# Patient Record
Sex: MALE | Race: WHITE | Employment: FULL TIME | URBAN - METROPOLITAN AREA
[De-identification: names, ages, dates, MRNs, and addresses within clinical notes are randomized per-mention and may not be internally consistent; named-entity substitution may affect disease eponyms.]

---

## 2018-11-15 ENCOUNTER — HOSPITAL ENCOUNTER (INPATIENT)
Age: 23
LOS: 3 days | Discharge: HOME | DRG: 885 | End: 2018-11-19
Attending: EMERGENCY MEDICINE | Admitting: PSYCHIATRY & NEUROLOGY
Payer: SELF-PAY

## 2018-11-15 LAB
ALBUMIN SERPL-MCNC: 3.9 G/DL (ref 3.4–5)
ALBUMIN/GLOB SERPL: 1 {RATIO} (ref 0.8–1.7)
ALP SERPL-CCNC: 53 U/L (ref 45–117)
ALT SERPL-CCNC: 24 U/L (ref 16–61)
AMPHET UR QL SCN: NEGATIVE
ANION GAP SERPL CALC-SCNC: 8 MMOL/L (ref 3–18)
AST SERPL-CCNC: 16 U/L (ref 15–37)
BARBITURATES UR QL SCN: NEGATIVE
BASOPHILS # BLD: 0 K/UL (ref 0–0.1)
BASOPHILS NFR BLD: 0 % (ref 0–2)
BENZODIAZ UR QL: NEGATIVE
BILIRUB SERPL-MCNC: 0.2 MG/DL (ref 0.2–1)
BUN SERPL-MCNC: 24 MG/DL (ref 7–18)
BUN/CREAT SERPL: 22 (ref 12–20)
CALCIUM SERPL-MCNC: 9.3 MG/DL (ref 8.5–10.1)
CANNABINOIDS UR QL SCN: NEGATIVE
CHLORIDE SERPL-SCNC: 108 MMOL/L (ref 100–108)
CO2 SERPL-SCNC: 26 MMOL/L (ref 21–32)
COCAINE UR QL SCN: NEGATIVE
CREAT SERPL-MCNC: 1.07 MG/DL (ref 0.6–1.3)
DIFFERENTIAL METHOD BLD: ABNORMAL
EOSINOPHIL # BLD: 0.1 K/UL (ref 0–0.4)
EOSINOPHIL NFR BLD: 1 % (ref 0–5)
ERYTHROCYTE [DISTWIDTH] IN BLOOD BY AUTOMATED COUNT: 12.9 % (ref 11.6–14.5)
ETHANOL SERPL-MCNC: <3 MG/DL (ref 0–3)
GLOBULIN SER CALC-MCNC: 3.8 G/DL (ref 2–4)
GLUCOSE SERPL-MCNC: 126 MG/DL (ref 74–99)
HCT VFR BLD AUTO: 40 % (ref 36–48)
HDSCOM,HDSCOM: NORMAL
HGB BLD-MCNC: 14.2 G/DL (ref 13–16)
LYMPHOCYTES # BLD: 2 K/UL (ref 0.9–3.6)
LYMPHOCYTES NFR BLD: 22 % (ref 21–52)
MCH RBC QN AUTO: 31.2 PG (ref 24–34)
MCHC RBC AUTO-ENTMCNC: 35.5 G/DL (ref 31–37)
MCV RBC AUTO: 87.9 FL (ref 74–97)
METHADONE UR QL: NEGATIVE
MONOCYTES # BLD: 0.7 K/UL (ref 0.05–1.2)
MONOCYTES NFR BLD: 8 % (ref 3–10)
NEUTS SEG # BLD: 6.3 K/UL (ref 1.8–8)
NEUTS SEG NFR BLD: 69 % (ref 40–73)
OPIATES UR QL: NEGATIVE
PCP UR QL: NEGATIVE
PLATELET # BLD AUTO: 296 K/UL (ref 135–420)
PMV BLD AUTO: 9.5 FL (ref 9.2–11.8)
POTASSIUM SERPL-SCNC: 4.2 MMOL/L (ref 3.5–5.5)
PROT SERPL-MCNC: 7.7 G/DL (ref 6.4–8.2)
RBC # BLD AUTO: 4.55 M/UL (ref 4.7–5.5)
SODIUM SERPL-SCNC: 142 MMOL/L (ref 136–145)
WBC # BLD AUTO: 9.1 K/UL (ref 4.6–13.2)

## 2018-11-15 PROCEDURE — 99285 EMERGENCY DEPT VISIT HI MDM: CPT

## 2018-11-15 PROCEDURE — 80307 DRUG TEST PRSMV CHEM ANLYZR: CPT

## 2018-11-15 PROCEDURE — 85025 COMPLETE CBC W/AUTO DIFF WBC: CPT

## 2018-11-15 PROCEDURE — 80053 COMPREHEN METABOLIC PANEL: CPT

## 2018-11-15 RX ORDER — SPIRONOLACTONE 100 MG/1
100 TABLET, FILM COATED ORAL 2 TIMES DAILY
COMMUNITY
End: 2019-07-20

## 2018-11-15 RX ORDER — ESTRADIOL 2 MG/1
TABLET ORAL
COMMUNITY
End: 2019-07-20

## 2018-11-16 PROBLEM — F32.A DEPRESSION: Status: ACTIVE | Noted: 2018-11-16

## 2018-11-16 PROCEDURE — 65220000003 HC RM SEMIPRIVATE PSYCH

## 2018-11-16 RX ORDER — TRAZODONE HYDROCHLORIDE 50 MG/1
50 TABLET ORAL
Status: DISCONTINUED | OUTPATIENT
Start: 2018-11-16 | End: 2018-11-19 | Stop reason: HOSPADM

## 2018-11-16 RX ORDER — IBUPROFEN 600 MG/1
600 TABLET ORAL
Status: DISCONTINUED | OUTPATIENT
Start: 2018-11-16 | End: 2018-11-19 | Stop reason: HOSPADM

## 2018-11-16 RX ORDER — LORAZEPAM 1 MG/1
2 TABLET ORAL
Status: DISCONTINUED | OUTPATIENT
Start: 2018-11-16 | End: 2018-11-19 | Stop reason: HOSPADM

## 2018-11-16 RX ORDER — LORAZEPAM 2 MG/ML
2 INJECTION INTRAMUSCULAR
Status: DISCONTINUED | OUTPATIENT
Start: 2018-11-16 | End: 2018-11-19 | Stop reason: HOSPADM

## 2018-11-16 RX ORDER — LORAZEPAM 2 MG/ML
1 INJECTION INTRAMUSCULAR
Status: DISCONTINUED | OUTPATIENT
Start: 2018-11-16 | End: 2018-11-19 | Stop reason: HOSPADM

## 2018-11-16 RX ORDER — LORAZEPAM 1 MG/1
1 TABLET ORAL
Status: DISCONTINUED | OUTPATIENT
Start: 2018-11-16 | End: 2018-11-19 | Stop reason: HOSPADM

## 2018-11-16 NOTE — BSMART NOTE
Comprehensive Assessment Form Part 1 Section I - Disposition Discussed with Dr. Miguel Hamlin, the plan is to admit; however, there are no available acute psychiatric beds available at [facility]. Calls placed to [facility] and [facility], there are no beds at these facilities, either; will continue to seek bed for patient. Section II - Integrated Summary Patient is a 26year old male, \"transgender,\" who presented to the emergency room with \"suicidal thoughts and voices telling me to kill myself. \"  Patient also stated that he is \"still suicidal with random homicidal thoughts, sad, depressed, and don't know the reasons for feeling this way. \" Patient verbalized that he does not have a plan for suicide or homicide at this time. Patient is willing for voluntary admission for treatment; will seek a bed for patient. The patient is deemed competent to provide informed consent. The information is given by the patient. The Chief Complaint is \"suicidal/homicidal thoughts, voices. \" Previous Hospitalizations:\" None\" Current Psychiatrist and/or  is \"None. \" Section IV - Mental Status Exam 
The patient's appearance bluish/green colored hair, dressed in scrubs, fair hygiene. The patient's behavior shows no evidence of impairment. The patient is oriented to time, place, person and situation. The patient's speech shows no evidence of impairment. The patient's mood  is depressed. The range of affect is flat. The patient's thought content  demonstrates no evidence of impairment. The thought process shows no evidence of impairment. The patient's memory shows no evidence of impairment. The patient's appetite shows no evidence of impairment. The patient's sleep \"3-5 hrs/night, but up most of the night. \". Insight is impaired. The patient's judgement is psychologically impaired. Section VI - Living Arrangements The patient is single. The patient lives with a parent. The patient has no children. The patient does plan to return home upon discharge. The patient does not have legal issues pending. The patient's source of income comes from employment. Yazidism and cultural practices have not been voiced at this time. The patient's greatest support comes from [facility], brother, [phone]. \" 
 
 Keturah Palma RN

## 2018-11-16 NOTE — ED NOTES
TRANSFER - OUT REPORT: 
 
Verbal report given to JIM Angulo(name) on Rosalee Aranda  being transferred to Adult behavioral health(unit) for routine progression of care Report consisted of patients Situation, Background, Assessment and  
Recommendations(SBAR). Information from the following report(s) ED Summary was reviewed with the receiving nurse. Lines:    
 
Opportunity for questions and clarification was provided.    
 
Patient transported with:

## 2018-11-16 NOTE — BSMART NOTE
Patient continues to verbalize suicidal thoughts with plan to cut his wrist. Has multiple cuts to both arms. Easily engaged, states he has been depressed for a long time with no specific triggers. No history of previous treatment, no prescribed medications for depression. Stated the only counseling  he has ever received was a two day free online service. Stated he is ok with rooming with a male patient even though he is in the process of changing his gender, has male anatomy. Will continue to search placement for him.

## 2018-11-16 NOTE — ED PROVIDER NOTES
EMERGENCY DEPARTMENT HISTORY AND PHYSICAL EXAM 
 
1:47 AM 
 
 
Date: 11/15/2018 Patient Name: Rosanna Evans History of Presenting Illness Chief Complaint Patient presents with  Mental Health Problem History Provided By: Patient Chief Complaint: Suicidal ideations Duration: Years Timing:  Constant Location: NA 
Quality: NA Severity: Moderate Modifying Factors: None Associated Symptoms: depression and self-injury Additional History (Context): Rosanna Evans is a 26 y.o. adult with no PMHx who presents with c/o moderate constant suicidal ideations that started Years ago with associated Sx of depression and self-injury. Pt states he has had thoughts of suicide for as long as he can remember. Pt states he is not currently on medications for depression and has never been. He sates that nothing in particular happened tonight that made the suicidal thoughts worse. Denies any further complaints or symptoms at the moment. PCP: None Current Outpatient Medications Medication Sig Dispense Refill  spironolactone (ALDACTONE) 100 mg tablet Take 200 mg by mouth daily.  estradiol (ESTRACE) 0.5 mg tablet Take  by mouth daily.  PROGESTERONE by Does Not Apply route. Past History Past Medical History: No past medical history on file. Past Surgical History: No past surgical history on file. Family History: No family history on file. Social History: 
Social History Tobacco Use  Smoking status: Not on file Substance Use Topics  Alcohol use: Not on file  Drug use: Not on file Allergies: 
No Known Allergies Review of Systems Review of Systems Psychiatric/Behavioral: Positive for self-injury and suicidal ideas. Positive for depression All other systems reviewed and are negative. Physical Exam  
 
Visit Vitals /79 (BP 1 Location: Left arm) Pulse (!) 108 Temp 98.8 °F (37.1 °C) Resp 17  
 Wt 85.3 kg (188 lb) SpO2 99% Physical Exam  
Psychiatric: She expresses suicidal ideation. Patient has several self inflicted superficial lacerations of bilateral arms Physical Exam: 
. Patient Vitals for the past 12 hrs: 
 Temp Pulse Resp BP SpO2  
11/15/18 2156 98.8 °F (37.1 °C) (!) 108 17 136/79 99 % Gen: Well developed, well nourished 26 y.o. adult HEENT: Normocephalic, atraumatic Respiratory: No accessory muscle use No wheeze, No rales, No rhonchi. Normal chest wall excursion. No subcutaneous air, no rib crepitus Cardiovascular: Regular rhythm and rate, Normal pulses, Normal perfusion. No edema Gastrointestinal: Non distended, Non tender, No masses. No ascites. No organomegaly. No evidence of trauma Musculoskeletal: Full range of motion at all other tested joints. No joint effusions. Neuro: Normal strength, Normal sensation. Normal speech. No ataxia. Cranial Nerves II-XII normal as tested. Skin: No rash, petechia or purpura. Warm and dry Psyche: No suicidal ideation, No homicidal ideation. No hallucinations. Organized thoughts. Heme: Normal 
: Deferred Diagnostic Study Results Labs - Recent Results (from the past 12 hour(s)) DRUG SCREEN, URINE Collection Time: 11/15/18 10:01 PM  
Result Value Ref Range BENZODIAZEPINES NEGATIVE  NEG    
 BARBITURATES NEGATIVE  NEG    
 THC (TH-CANNABINOL) NEGATIVE  NEG    
 OPIATES NEGATIVE  NEG    
 PCP(PHENCYCLIDINE) NEGATIVE  NEG    
 COCAINE NEGATIVE  NEG    
 AMPHETAMINES NEGATIVE  NEG METHADONE NEGATIVE  NEG HDSCOM (NOTE) CBC WITH AUTOMATED DIFF Collection Time: 11/15/18 10:54 PM  
Result Value Ref Range WBC 9.1 4.6 - 13.2 K/uL  
 RBC 4.55 (L) 4.70 - 5.50 M/uL  
 HGB 14.2 13.0 - 16.0 g/dL HCT 40.0 36.0 - 48.0 % MCV 87.9 74.0 - 97.0 FL  
 MCH 31.2 24.0 - 34.0 PG  
 MCHC 35.5 31.0 - 37.0 g/dL  
 RDW 12.9 11.6 - 14.5 % PLATELET 695 594 - 544 K/uL  MPV 9.5 9.2 - 11.8 FL  
 NEUTROPHILS 69 40 - 73 % LYMPHOCYTES 22 21 - 52 % MONOCYTES 8 3 - 10 % EOSINOPHILS 1 0 - 5 % BASOPHILS 0 0 - 2 %  
 ABS. NEUTROPHILS 6.3 1.8 - 8.0 K/UL  
 ABS. LYMPHOCYTES 2.0 0.9 - 3.6 K/UL  
 ABS. MONOCYTES 0.7 0.05 - 1.2 K/UL  
 ABS. EOSINOPHILS 0.1 0.0 - 0.4 K/UL  
 ABS. BASOPHILS 0.0 0.0 - 0.1 K/UL  
 DF AUTOMATED METABOLIC PANEL, COMPREHENSIVE Collection Time: 11/15/18 10:54 PM  
Result Value Ref Range Sodium 142 136 - 145 mmol/L Potassium 4.2 3.5 - 5.5 mmol/L Chloride 108 100 - 108 mmol/L  
 CO2 26 21 - 32 mmol/L Anion gap 8 3.0 - 18 mmol/L Glucose 126 (H) 74 - 99 mg/dL BUN 24 (H) 7.0 - 18 MG/DL Creatinine 1.07 0.6 - 1.3 MG/DL  
 BUN/Creatinine ratio 22 (H) 12 - 20 GFR est AA >60 >60 ml/min/1.73m2 GFR est non-AA >60 >60 ml/min/1.73m2 Calcium 9.3 8.5 - 10.1 MG/DL Bilirubin, total 0.2 0.2 - 1.0 MG/DL  
 ALT (SGPT) 24 16 - 61 U/L  
 AST (SGOT) 16 15 - 37 U/L Alk. phosphatase 53 45 - 117 U/L Protein, total 7.7 6.4 - 8.2 g/dL Albumin 3.9 3.4 - 5.0 g/dL Globulin 3.8 2.0 - 4.0 g/dL A-G Ratio 1.0 0.8 - 1.7 ETHYL ALCOHOL Collection Time: 11/15/18 10:54 PM  
Result Value Ref Range ALCOHOL(ETHYL),SERUM <3 0 - 3 MG/DL Radiologic Studies - No orders to display Medical Decision Making I am the first provider for this patient. I reviewed the vital signs, available nursing notes, past medical history, past surgical history, family history and social history. Vital Signs-Reviewed the patient's vital signs. Pulse Oximetry Analysis -  99% on room air (Interpretation) normal 
 
Records Reviewed: Nursing Notes (Time of Review: 1:47 AM) Diagnosis Clinical Impression: Mood disorder 6:41 AM : Pt care transferred to Dr. Lauren Sierra  ,ED provider. History of patient complaint(s), available diagnostic reports and current treatment plan has been discussed thoroughly. Bedside rounding on patient occured : yes . Pending diagnostics reports and/or labs (please list): Crisis evaluation Disposition: Pending Follow-up Information None Medication List  
  
ASK your doctor about these medications   
estradiol 0.5 mg tablet Commonly known as:  ESTRACE PROGESTERONE 
  
spironolactone 100 mg tablet Commonly known as:  ALDACTONE 
  
  
 
_______________________________ Scribe Attestation Natacha Chavez acting as a scribe for and in the presence of Imani Carter MD     
November 16, 2018 at 1:47 AM 
    
Provider Attestation:     
I personally performed the services described in the documentation, reviewed the documentation, as recorded by the scribe in my presence, and it accurately and completely records my words and actions. November 16, 2018 at 1:47 AM - Imani Carter MD   
 
 
_______________________________

## 2018-11-16 NOTE — ED TRIAGE NOTES
States he is having voiceds in his head. States voices tell  Him \"bad Things\"   States he is having thoughts of hurting self, denies plan

## 2018-11-16 NOTE — ED NOTES
proceded to draw blood on patient. Pt raised sleeve and I noted 4 fresh straight line cut marks with dried blood on right forarm. Pt states he cut it with a razor this afternoon

## 2018-11-17 PROCEDURE — 65220000003 HC RM SEMIPRIVATE PSYCH

## 2018-11-17 PROCEDURE — 74011250637 HC RX REV CODE- 250/637: Performed by: PSYCHIATRY & NEUROLOGY

## 2018-11-17 RX ORDER — SPIRONOLACTONE 25 MG/1
100 TABLET ORAL 2 TIMES DAILY
Status: DISCONTINUED | OUTPATIENT
Start: 2018-11-17 | End: 2018-11-19 | Stop reason: HOSPADM

## 2018-11-17 RX ORDER — PROGESTERONE 100 MG/1
100 CAPSULE ORAL
Status: DISCONTINUED | OUTPATIENT
Start: 2018-11-17 | End: 2018-11-19 | Stop reason: HOSPADM

## 2018-11-17 RX ORDER — ESTRADIOL 1 MG/1
2 TABLET ORAL 2 TIMES DAILY
Status: DISCONTINUED | OUTPATIENT
Start: 2018-11-17 | End: 2018-11-17

## 2018-11-17 RX ORDER — ESTRADIOL 1 MG/1
3 TABLET ORAL 2 TIMES DAILY
Status: DISCONTINUED | OUTPATIENT
Start: 2018-11-17 | End: 2018-11-19 | Stop reason: HOSPADM

## 2018-11-17 RX ADMIN — PROGESTERONE 100 MG: 100 CAPSULE ORAL at 21:40

## 2018-11-17 RX ADMIN — ESTRADIOL 3 MG: 1 TABLET ORAL at 21:40

## 2018-11-17 RX ADMIN — SPIRONOLACTONE 100 MG: 25 TABLET ORAL at 21:40

## 2018-11-17 NOTE — H&P
HPI: Simba Arreola is a transgendered 26 y.o. Male on hormone therapy (He identifies as female and prefers to be called Vicki, although he indicated he would be fine in a male room) who presented to the ED with SI and reporting AH telling him to kill himself. He also reported HI, but with no plan. On my interview on the unit he denied SI, HI, AH, or VH, and said he felt \"a lot better. \" He could not state any triggers for his SI. 
 
PPH: He reports no previous psychiatric or psychotherapeutic treatment. He reports feeling depressed and suicidal \"for as long as I can remember, but I've never talked to anyone about it. \" He has cut himself with a razor on his arms and legs, but reports this is for self-harm, not suicide. PMH: Began hormone treatment 3/18: 
Spironolactone 100 mg. 2qd Estradiol 0.5 mg. (per chart, he reports 6 mg. Every day, 4 mg. qAM and 2 mg. Qhs). Progesterone (\"does not apply\" per chart). SA: Occasional alcohol use, denies drug use. Tox screen and BAL on admission were negative. Lab Data: Unremarkable on admission. MSE: Yusef Salgado is a 26 y.o. transgendered individual with hair dyed blue and green. He makes good eye contact, and speech is normal in rate and tone. Thought is goal oriented, with no tangentiality, circumstantiality, or flight of ideas. Mood is sad, affect is unremarkable. There are no delusions or illusions. Insight and judgement are fair to poor. Assessment: 
Major Depressive Disorder, Recurrent (F33.9) Gender Dysphoria (F64.1) Plan: Observe patient over the weekend. Possible discharge early in the week. Restart outpatient hormone treatment. Estimated length of stay: 3-5 days. Ori López MD 
Psychiatry

## 2018-11-17 NOTE — BH NOTES
GROUP THERAPY PROGRESS NOTE Jobmarisela Alma is participating in [place]. Group time: 30 minutes Personal goal for participation: verify insight and reality orientation; discuss unit issues and guideline compliance Goal orientation: personal 
 
Group therapy participation: passive

## 2018-11-17 NOTE — PROGRESS NOTES
Problem: Suicide/Homicide (Adult/Pediatric) Goal: *STG: Attends activities and groups Patient will attend at least 50% or 2 of 4 groups daily throughout hospital stay. Outcome: Progressing Towards Goal 
Pt will attend at least 50% of groups/activities daily during this admission. Goal: *STG/LTG: Complies with medication therapy Patient will take medication as prescribed every shift throughout hospital stay. Outcome: Progressing Towards Goal 
Pt will comply with daily medication regimen during this admission. Goal: *STG/LTG:  No longer expresses self destructive or suicidal/homicidal thoughts Patient will verbalize denial of suicidal/ideation every shift throughout hospital stay. Outcome: Progressing Towards Goal 
Pt will verbalize a decrease and/or denial of SI every shift daily during this admission. Comments: Pt is in dayroom reading and very isolated. Pt denies SI/HI also denies experiencing depression at this time. Will continue to monitor for safety throughout the shift.

## 2018-11-17 NOTE — BH NOTES
Patient arrived on unit via wheel chair, alert and oriented. Patient he was admitted for suicidal ideation with patient stating \"I was having suicidal thoughts depression the usual\". Patient reported he had been having these thoughts for \"years\". Patient has denied current thoughts of suicide and has contracted for safety. Patient endorsees A/H \"just hearing rarely\". Patient has been anxious since arriving on unit and is guarded and quiet. Patient has been cooperative with completion of nursing assessment. Patient allergies: NKDA. Patient denies medical history. Patient denies substance abuse history including Tobacco use. Patient's belongings checked in and documented. Patient rights have ngiven and he acknowledged understanding. Patient admission paper work signed with unit tour completed. Patient rights have been provided and acknowledged. Patient has been provided with and encouraged use of non slip footwear for ambulation. Will monitor for safety with support as needed throughout treatment regimen.

## 2018-11-18 PROCEDURE — 65220000003 HC RM SEMIPRIVATE PSYCH

## 2018-11-18 PROCEDURE — 74011250637 HC RX REV CODE- 250/637: Performed by: PSYCHIATRY & NEUROLOGY

## 2018-11-18 RX ADMIN — PROGESTERONE 100 MG: 100 CAPSULE ORAL at 21:25

## 2018-11-18 RX ADMIN — ESTRADIOL 3 MG: 1 TABLET ORAL at 08:32

## 2018-11-18 RX ADMIN — SPIRONOLACTONE 100 MG: 25 TABLET ORAL at 09:12

## 2018-11-18 RX ADMIN — ESTRADIOL 3 MG: 1 TABLET ORAL at 21:25

## 2018-11-18 RX ADMIN — SPIRONOLACTONE 100 MG: 25 TABLET ORAL at 21:25

## 2018-11-18 NOTE — BH NOTES
GROUP THERAPY PROGRESS NOTE Anmol Avalos is participating in [place]. Group time: 30 minutes Personal goal for participation: Reviewed Unit Guidelines/Discharges Goal orientation: community Group therapy participation: passive Therapeutic interventions reviewed and discussed:  
 
Impression of participation: Pt sat quietly in group appeared to be listening but voiced no concerns.

## 2018-11-18 NOTE — PROGRESS NOTES
Israel/Vicki denies depression, SI, HI, AH, or VH. She would like to get back to work. Assessment: 
Major Depressive Disorder, Recurrent (F33.9) Gender Dysphoria (F64.1) 
  
Plan: Consider discharge tomorrow. Hormone treatment has been restarted. 
  
Ori Mijares MD 
Psychiatry

## 2018-11-18 NOTE — PROGRESS NOTES
Problem: Falls - Risk of 
Goal: *Absence of Falls Patient will remain free of falls every shift throughout hospital stay. Patient will verbalize understanding regarding safety and fall prevention measures to be utilized every shift throughout hospital stay. Document Lexus Wheeler Fall Risk and appropriate interventions in the flowsheet. Outcome: Progressing Towards Goal 
Fall Risk Interventions: 
  
 
  
 
Medication Interventions: Teach patient to arise slowly Problem: Suicide/Homicide (Adult/Pediatric) Goal: *STG: Attends activities and groups Patient will attend at least 50% or 2 of 4 groups daily throughout hospital stay. Outcome: Progressing Towards Goal 
Pt will attend at least 2 out of 4 groups daily during this admission. Goal: *STG/LTG: Complies with medication therapy Patient will take medication as prescribed every shift throughout hospital stay. Outcome: Progressing Towards Goal 
Pt will comply with daily medication regimen during this admission. Comments: Pt observed in the milieu eating and reading. Pt interacts with staff appropriately. Pt denies SI/HI and also denies AH. Pt is smiling and denies depression at this time. Will continue to monitor for safety.

## 2018-11-18 NOTE — BH NOTES
Patient was quiet and to herself during this shift. She was observed reading her book in the milieu and bedroom. Patient did not socialize or engage with others. Patient ate her dinner meal with no issues. She did not have any falls or injuries during this shift. No behavior changes to report. Staff will continue to monitor  Patient for safety.

## 2018-11-18 NOTE — BH NOTES
GROUP THERAPY PROGRESS NOTE Garret Carpenter is not participating in [employer]. Group time: 30 minutes Personal goal for participation: N/A Goal orientation: relaxation Group therapy participation: Patient chose not to participate Therapeutic interventions reviewed and discussed: Stress management Impression of participation: Staff encouraged patient to participate in group, but patient refused.

## 2018-11-18 NOTE — BH NOTES
GROUP THERAPY PROGRESS NOTE Anish Nunez is not participating in [employer]. Group time: 45 minutes Personal goal for participation: N/A Goal orientation: relaxation Group therapy participation: Patient chose not to participate Therapeutic interventions reviewed and discussed: Stress management Impression of participation: Patient was encouraged by staff to participate but patient refused.

## 2018-11-19 VITALS
RESPIRATION RATE: 20 BRPM | HEART RATE: 91 BPM | HEIGHT: 71 IN | OXYGEN SATURATION: 99 % | SYSTOLIC BLOOD PRESSURE: 116 MMHG | BODY MASS INDEX: 26.32 KG/M2 | WEIGHT: 188 LBS | DIASTOLIC BLOOD PRESSURE: 73 MMHG | TEMPERATURE: 98.1 F

## 2018-11-19 PROBLEM — F33.2 MDD (MAJOR DEPRESSIVE DISORDER), RECURRENT SEVERE, WITHOUT PSYCHOSIS (HCC): Status: ACTIVE | Noted: 2018-11-16

## 2018-11-19 PROCEDURE — 74011250637 HC RX REV CODE- 250/637: Performed by: PSYCHIATRY & NEUROLOGY

## 2018-11-19 RX ADMIN — ESTRADIOL 3 MG: 1 TABLET ORAL at 08:23

## 2018-11-19 RX ADMIN — SPIRONOLACTONE 100 MG: 25 TABLET ORAL at 08:23

## 2018-11-19 NOTE — BSMART NOTE
OCCUPATIONAL THERAPY PROGRESS NOTE Group Time:  [id number] Attendance: The patient attended full group. Participation: The patient participated fully in the activity. [facility] Attention: The patient was able to focus on the activity. Interaction: The patient frequently interacts with others. Able to ID some goals to work on.

## 2018-11-19 NOTE — BH NOTES
GROUP THERAPY PROGRESS NOTE Mega Mon is participating in [place]. Group time: 15 minutes Personal goal for participation: Unit Guidelines/Positive Behavior/Discharge Goal orientation: personal 
 
Group therapy participation: minimal 
 
Therapeutic interventions reviewed and discussed: Pt to continue participation in treatment and to discuss any issues with doctor. Impression of participation: Pt attended and asked only who his doctor is. Voiced no complaints or concerns.

## 2018-11-19 NOTE — BH NOTES
Patient's boots were found and brought to unit by Crisis supervisor. Patient received boots and was escorted to emergency dept by T to meet father for transportation to home address.

## 2018-11-19 NOTE — BH NOTES
Patient has been cooperative and pleasant during this nurse's shift. Patient has not required PRN medications during this shift. Patient has eaten all meals and has been free from falls and harm. Patient has attended groups and has been appropriate with questions and responses. Patient has copy of discharge papers with information regarding outpatient treatment. Patient has no new prescriptions to be filled at pharmacy. Patient has all belongings except for \"black platform boots\" which she states she was wearing when she arrived to emergency dept. Emergency Dept called and Crisis supervisor contacted and will  boots once found. Patient has signed all paperwork and armband was taken and shredded. Patient denies thoughts of self harm or harm to others at this time. Patient will be returning to home address and transportation to be provided by father. Will continue to monitor and provided interventions as needed.

## 2018-11-19 NOTE — DISCHARGE SUMMARY
[address] [place]  MR#: [id number]  : 1995  ACCOUNT #: [de-identified]   ADMIT DATE: 11/15/2018  DISCHARGE DATE: 2018    SIGNIFICANT FINDINGS:  The patient was admitted to the facility under the care of the undersigned AND was evaluated by the psychiatrist on call, Dr. Jazmyn Menezes, shortly after admission. REASON FOR HOSPITALIZATION:  The presence of suicidal ideations with his initially reporting the presence of auditory hallucinations that were telling him to harm himself;  however, at the time in which he was actually evaluated by Dr. Jazmyn Menezes (above history was provided when the patient arrived at [facility] [place] [facility]"" Emergency Department), the patient denied any hallucinatory process and even though he described feeling rather despondent, by the time of admission, he began to deny any thoughts of harming self and/or others. The patient, who is a transgender 79-year-old male on hormonal therapy (identifies himself as a female and prefers to be called Violet) was observed very closely throughout his hospital stay. His physical exam showed the presence of superficial cuts on his arms and forearms; however, otherwise negative findings. The patient when examined in the emergency department showed within his H and P nothing specific from a change point of view in regards to his breasts and/or any other anatomical changes that could have been created by the prescriptions for hormones, which he has been taking for a while. Regardless, multiple labs had been performed prior to the patient's admission, including a CBC with differential that showed completely normal test results. A complete metabolic panel showed normal electrolytes with a sodium 132, potassium 4.2, chloride of 108, BUN of 24, creatinine of 107, estimated GFR for a non-African American about 60 mL per minute. Normal liver function does noted. Urine drug screen was negative.   Alcohol level was below 3. COURSE OF HOSPITALIZATION AND TREATMENT:  Admitted to the adult CD program.  The patient was seen on a daily individual psychiatric basis and was referred to the groups under the context of the program.  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 Indeed, when I met with the patient, he requested to be discharged; however, he did agree to be seen as an outpatient. Initially, the patient had indicated that he had been doing some outpatient work via, of course, the internet, \"I was somewhat hesitant to go to see someone on a regular basis in this area. \"  However, upon a strong suggestion, the patient did agree to be followed as an outpatient. I discussed the case with his assigned care manager, who kindly called the patient's father. His father is agreeable with the patient coming back home after he talked to him on the phone. He indicated that his only concern was the patient not becoming agreeable to be followed as an outpatient. Obviously, his overall prognosis will depend upon his being compliant with that referral.  The patient will be referred, by the way, to the local [facility] for outpatient treatment is indicated. CONDITION UPON DISCHARGE:  Not psychotic, not organic, not suicidal or homicidal, and psychiatrically competent. CLINICAL IMPRESSION:    AXIS I:  Major depressive disorder, single episode, without psychosis, severe; gender dysphoria. AXIS II:  Noncontributory. AXIS III:  Superficial lacerations to both forearms and arms. DISPOSITION:  The patient is being discharged with outpatient treatment with local [facility]. His hormonal treatment will be continued by his outpatient primary care physician.     PRESCRIPTIONS UPON DISCHARGE: The patient will continue to take Estrace 3 mg twice a day, Prometrium 100 mg every night at bedtime and Aldactone 100 mg twice daily. No evidence of medication-related side effects described. During the time in which the patient was seen here, he was also advised of the possibility of considering treatment with antidepressants, which he refused. Further evaluation of his need for outpatient antidepressant treatment should be followed at the [facility]. PROGNOSIS:  Fair to guarded, depending upon the patient's treatment compliance and treatment response.       MD BHARGAV Mann/JOVANNI  D: 11/19/2018 13:38     T: 11/19/2018 15:25  JOB #: [id number]

## 2018-11-19 NOTE — BSMART NOTE
SW ENCOUNTER: The patient is a 26year old male (transgender) whom presented for acute stabilization due to worsening depressive symptoms and SI. The patient shared that he is having difficulty coping after a recent breakup; stressed with lots of life changes and maladaptive thought patterns. He resides with his father and stated that he feels supported int he home. The patient denied any known medical issues and allergies to any foods and or medications. The patient denied a history of eating disorders, acts of violence towards others, current legal issues, prior suicide attempts, psychiatric hospitalizations as well as sexual trauma, physical abuse and neglect. The patient shared that he consumes alcohol approximately 1-2x every two weeks; denied further illicit substance use. The patient presented as alert, responsive, oriented and amenable; denied current SI/HI, intent and AVH. The SW stressed the importance and benefits of outpatient therapy to address depressive symptoms, learn healthy coping, thinking, and self-care strategies, adequately adjust to the hormonal gender changes that he's decided to make and stress management. SW COMMUNITY CONTACT: The patient is being referred to [facility] for their same day access service available Monday through Thursday from 8:30 am to 2:30 pm. The address and contact number is [facility], [facility] [place]; Phone: [phone].

## 2018-11-19 NOTE — PROGRESS NOTES
The pt was seen and case was discussed with staff. Attention is invited to the dictated discharge summary note, which is self explanatory.

## 2018-11-19 NOTE — BH NOTES
Pt was observed interacting with peers in the therapeutic milieu with no complications. Pt played \"cards\" with his peers, and appeared to enjoy hisself. Pt was medication and meal compliant today. Will continue to monitor pt for safety.

## 2018-11-19 NOTE — DISCHARGE INSTRUCTIONS
***IMPORTANT NUMBERS***        [address]        [phone]    [facility]       [phone]    Suicide Prevention     [phone]          Patient is alert x3 and ambulatory. Patient has copy of discharge papers with information for follow up appt. Patient has prescriptions to be filled at pharmacy of choice. Patient has form to return to work dated 11/20/2018. Patient has all personal belongings and has signed form. Patient received valuables from security. Patient denies thoughts of self harm or harm to others at this time. Patient armband taken and shredded. Patient discharged to home address with transportation provided by father.

## 2019-07-17 PROBLEM — T39.1X2A ACETAMINOPHEN OVERDOSE, INTENTIONAL SELF-HARM, INITIAL ENCOUNTER (HCC): Status: ACTIVE | Noted: 2019-07-17
